# Patient Record
Sex: FEMALE | Race: BLACK OR AFRICAN AMERICAN | Employment: OTHER | URBAN - METROPOLITAN AREA
[De-identification: names, ages, dates, MRNs, and addresses within clinical notes are randomized per-mention and may not be internally consistent; named-entity substitution may affect disease eponyms.]

---

## 2024-01-04 ENCOUNTER — EVALUATION (OUTPATIENT)
Dept: PHYSICAL THERAPY | Age: 84
End: 2024-01-04
Payer: MEDICARE

## 2024-01-04 DIAGNOSIS — M54.2 CERVICAL PAIN: Primary | ICD-10-CM

## 2024-01-04 DIAGNOSIS — M25.511 CHRONIC PAIN OF BOTH SHOULDERS: ICD-10-CM

## 2024-01-04 DIAGNOSIS — M25.512 CHRONIC PAIN OF BOTH SHOULDERS: ICD-10-CM

## 2024-01-04 DIAGNOSIS — G89.29 CHRONIC PAIN OF BOTH SHOULDERS: ICD-10-CM

## 2024-01-04 PROCEDURE — 97140 MANUAL THERAPY 1/> REGIONS: CPT | Performed by: PHYSICAL THERAPIST

## 2024-01-04 PROCEDURE — 97162 PT EVAL MOD COMPLEX 30 MIN: CPT | Performed by: PHYSICAL THERAPIST

## 2024-01-04 NOTE — LETTER
2024    Nacho Kauffman  [address]  [facility] [address]    Patient: Tara Ash   YOB: 1940   Date of Visit: 2024     Encounter Diagnosis     ICD-10-CM    1. Cervical pain  M54.2       2. Chronic pain of both shoulders  M25.511     G89.29     M25.512           Dear Dr. Kauffman:    Thank you for your recent referral of Tara Ash. Please review the attached evaluation summary from Tara's recent visit.     Please verify that you agree with the plan of care by signing the attached order.     If you have any questions or concerns, please do not hesitate to call.     I sincerely appreciate the opportunity to share in the care of one of your patients and hope to have another opportunity to work with you in the near future.       Sincerely,    Keegan Acosta, PT      Referring Provider:      I certify that I have read the below Plan of Care and certify the need for these services furnished under this plan of treatment while under my care.                    Nacho Kauffman  [address] [place] [address]  Via Fax: [phone]          PT Evaluation     Today's date: 2024  Patient name: Tara Ash  : 1940  MRN: [id number]  Referring provider: Keegan Acosta PT  Dx:   Encounter Diagnosis     ICD-10-CM    1. Cervical pain  M54.2       2. Chronic pain of both shoulders  M25.511     G89.29     M25.512                      Assessment  Assessment details: PT IE: 24.  Patient reported she had a cervical spine laminectomy in .  Patient noted she has tightness from the shoulders into the cervical spine.  Patient reported she had open hear surgery, a right RTC repair and left lumpectomy.  Patient noted she had a fell in  which required a left TKA in  and had recently finished home care PT.  Patient noted she did use a spc prior to fall, and now uses RW.  Patient noted prolonged use of rolling  walker aggravates bilateral GH joint, scapular and cervical spine tightness and pain.  Patient noted she has intermittent tingling sensation into upper cervical spine and posterior head.  Patient noted looking to each side is limited by tightness, as well as looking up.  Patient noted constant tightness is very uncomfortable.  Patient noted dressing activities, reaching, lifting, grooming activities are limited by bilateral upper extremity tightness and pain.  Patient noted she has bilateral deltoid muscle soreness.  Patient noted she will have and intermittent left hand trigger finger.  Patient denies unilateral upper extremity paraesthesias.  Patient noted she has bilateral upper extremity weakness and tightness that limits self iadls and functional activities.  Patient noted reaching back to put on a coat is difficult.  Patient noted cervical spine and bilateral shoulder region pain at rest is a 5 of 10 and at worst is at 8 of 10.  Patient noted she uses heat and massage to reduce tightness.  Patient denies sleep deficits.  Patient noted prolonged use of RW aggravates bilateral GH joint, scapular and cervical spine region pain. Patient noted she has crepitus with cervical spine movements, but denies pain with crepitus.   Impairments: abnormal or restricted ROM, abnormal movement, impaired physical strength, lacks appropriate home exercise program and pain with function  Understanding of Dx/Px/POC: excellent   Prognosis: good  Prognosis details: Patient is a 83 y.o. year old female seen for outpatient PT evaluation with pain, mobility and functional deficits due to cervical spine and bilateral shoulder pain. Patient presents to PT IE with the following problems, concerns, deficits and impairments: cervical spine and bilateral upper extremity pain, decreased cervical spine range of motion, decreased bilateral upper extremity mobility and strength, + TTP, decrease in postural awareness, functional limitations and  decreased tolerance to activity.  Patient would benefit from skilled PT services under the following PT treatment plan to address the above noted deficits: therapeutic exercises and activities to facilitate bilateral upper extremity mobility and strength, postural reeducation and strengthening, modalities, manual therapy techniques, IASTM techniques, Kinesio taping techniques and a hep.  Thank you for the referral.     Goals  Short Term goals 4 - 6 weeks  1.  Patient will be independent HEP.   2.  Patient will report a 25 - 50% decrease in pain complaints.  3.  Increase strength 1/2 grade.  4.  Increase ROM 5-10 degrees.    Long Term goals 8 - 12 weeks  1.  Patient will report elimination of pain complaints.  2.  Patient will return to all recreational activities without restriction.  3.  ROM WFL.  4.  Strength 5/5.  5.  Patient will report ability to dress without cervical spine or bilateral upper extremity symptom aggravation or limitations.  6.  Patient will report ability to self reaching activities without cervical spine or bilateral upper extremity symptom aggravation or limitations.  7.  Patient will report ability to perform self lifting based activities without cervical spine or bilateral upper extremity symptom aggravation or limitations.  8.  Patient will report ability to perform grooming activities without cervical spine or bilateral upper extremity symptom aggravation or limitations.    Plan  Patient would benefit from: PT eval and skilled physical therapy  Planned modality interventions: low level laser therapy, TENS, thermotherapy: hydrocollator packs, ultrasound, unattended electrical stimulation, traction, cryotherapy and electrical stimulation/Russian stimulation  Planned therapy interventions: IASTM, joint mobilization, kinesiology taping, manual therapy, massage, neuromuscular re-education, balance, balance/weight bearing training, body mechanics training, patient education, postural training,  compression, strengthening, self care, stretching, therapeutic activities, therapeutic exercise, therapeutic training, transfer training, flexibility, functional ROM exercises, gait training, graded activity, graded exercise, graded motor, home exercise program and IADL retraining  Frequency: 2x week  Duration in weeks: 12  Treatment plan discussed with: patient    Subjective Evaluation    History of Present Illness  Mechanism of injury:   Past Medical History:  Diagnosis Date  Ambulatory dysfunction 04/16/2017  Anxiety  Arthritis  Asthma  CAD (coronary artery disease)  Cataracts, bilateral  Colon polyps  Corneal dystrophy  Coronary atherosclerosis 08/23/2012  Duodenal ulcer  Esophageal reflux 04/11/2014  Essential hypertension 08/23/2012  Gastroparesis  GERD (gastroesophageal reflux disease)  Heart disease  Heart disease  Hiatal hernia  Hyperlipidemia  Hypertension  IBS (irritable bowel syndrome)  Low back pain  Migraine  PAD (peripheral artery disease) ([facility])  PAF (paroxysmal atrial fibrillation) ([facility]) 01/09/2020  Quadriceps tendon rupture 06/01/2023  Schatzki's ring 2014  Sinusitis  Spinal stenosis of lumbar region 03/26/2012  Subclavian artery stenosis, left ([facility])    Past Surgical History:  Procedure Laterality Date  ANGIOPLASTY Right 09/20/2017  (R) CFA angioplasty only  BACK SURGERY 2013  back fusion  BREAST LUMPECTOMY Left 2004  CARPAL TUNNEL RELEASE Right  CATARACT EXTRACTION Bilateral  CERVICAL LAMINECTOMY 1997  [facility]  COLONOSCOPY N/A 10/25/2018  Performed by Saman Gonzales MD at [facility] OR  CORONARY ARTERY BYPASS GRAFT 2005  EYE SURGERY  FEMORAL ARTERY - POPLITEAL ARTERY BYPASS GRAFT Right 04/11/2017  FEMORAL ENDARTERECTOMY Right 04/2017  HYSTERECTOMY  JOINT REPLACEMENT  KNEE SURGERY Right  MS COLONOSCOPY FLX DX W/COLLJ SPEC WHEN PFRMD N/A 09/14/2015  Procedure: COLONOSCOPY; Surgeon: Maicol Garcia MD; Location: [facility]; Service: Gastroenterology  QUADRICEPS TENDON  REPAIR Left 06/06/2023  knee  ROTATOR CUFF REPAIR Right  SINUS SURGERY  SINUS SURGERY  TOTAL ABDOMINAL HYSTERECTOMY W/ BILATERAL SALPINGOOPHORECTOMY  WISDOM TOOTH EXTRACTION    PE:  Gen: Pt sitting upright in chair in NAD  Left Lower Extremity: Incisions well healed. No erythema, drainage or evidence of wound dehiscence. no TTP overlying anterior knee. Neurovasc intact, distal pulses intact, sensory intact, pt able to move all toes ankle and knee.    RLE:  Well healed surgical incision, Mod soft tissue swelling, TTP anterior and medial joint line, crepitus, slightly limited ROM, small effusion, no lig instability    XR:None taken today    Dx:    6 Months S/p Left quadriceps tendon repair, OA    Acute on chronic right knee pain, OA - worsened  CT spine cervical wo contrast  Order: [id number]  Impression    IMPRESSION:    No acute intracranial trauma.    Postoperative changes of the cervical spine without evidence of acute fracture.    Please follow cervical spine trauma protocol          Workstation:[id number]  Narrative    History: Trauma    CT examination of the brain and cervical spine was performed using standard  departmental technique.    There are no prior studies available for comparison.      Findings:    The ventricles, sulci and cisterns are prominent consistent with age-appropriate  volume loss. There is intracranial vascular calcification. There is low  attenuation in the periventricular white matter, nonspecific but most suggestive  of small vessel ischemic disease. There is no evidence of intracranial  hemorrhage, mass, mass-effect or acute focal territorial infarction. There is no  depressed skull fracture.    Imaging of the cervical spine demonstrates straightening and slight reversal the  normal lordotic curve. There is loss disc space height at C4-5 and C5-6 C6-7  less so at C3-4. There is been prior laminectomies and resection spinous process  at C3, C4, C5, C6 and C7.    At C2-3 there are degenerative  change facets.    At C3-4 degenerative changes of the right greater than left facets and right  uncovertebral spurring causing right neural foraminal stenosis.    At C4-5 there is bilateral uncovertebral spurring with a posterior disc  osteophyte complex. There is bilateral neural foraminal stenosis.    At C5-6 is bilateral uncovertebral spurring with right greater than left neural  foraminal stenosis. Is a prominent posterior disc osteophyte complex.    At C6-7 there is right greater than left uncovertebral spurring.          Patient Goals  Patient goals for therapy: decreased pain, increased motion, increased strength, independence with ADLs/IADLs and return to sport/leisure activities    Pain  At best pain ratin  At worst pain ratin  Location: cervical spine and bilateral shoulders / scapular region        Objective     Tenderness     Additional Tenderness Details  Patient is     Active Range of Motion   Cervical/Thoracic Spine       Cervical    Flexion: 30 degrees   Extension: 24 degrees      Left lateral flexion: 14 degrees      Right lateral flexion: 16 degrees      Left rotation: 40 degrees with pain  Right rotation: 36 degrees    with pain  Left Shoulder   Flexion: 142 degrees with pain  Abduction: 138 degrees with pain    Right Shoulder   Flexion: 160 degrees   Abduction: 152 degrees with pain    Strength/Myotome Testing     Left Shoulder     Planes of Motion   Flexion: 4-   Abduction: 3+   External rotation at 0°: 4-   Internal rotation at 0°: 4     Right Shoulder     Planes of Motion   Flexion: 4-   Abduction: 3+   External rotation at 0°: 4-   Internal rotation at 0°: 4     Left Elbow   Flexion: 4+  Extension: 4    Right Elbow   Flexion: 4+  Extension: 4             Precautions: Fall Risk with recent fall history.      Access Code: [email or web address]  URL: [email or web address]  Date: 2024  Prepared by: Keegan Acosta    Exercises  - Correct Seated Posture  - 1 x daily - 7 x weekly - 2  sets - 10 reps  - Seated Gentle Upper Trapezius Stretch  - 1 x daily - 7 x weekly - 1 sets - 5 reps - 10 seconds hold  - Gentle Levator Scapulae Stretch  - 1 x daily - 7 x weekly - 1 sets - 5 reps - 10 seconds hold  - Seated Scapular Retraction  - 1 x daily - 7 x weekly - 2 sets - 10 reps      Manuals 1/4/24            STM to bilateral UT, cervical and thoracic spine paraspinal musculature 10 min seated  Trial supine                                                  Neuro Re-Ed                                                                                                        Ther Ex                          pulleys             Corner pec stretch with bilateral upper extremity at 45 degrees             Scapular squeezes:B: hep                         Seated physio ball lumbar / shoulder flexion:B:             UT stretch:B: without over pressure hep            LS stretch:B: without over pressure hep            Seated postural correction slouch over correct hep                         Cervical spine rotation with towel mobilization to each side                          Shoulder scaption and flexoin:B:             Biceps curls:B:             Tband rows and extension:B:                          Supine shoulder flexion and bench press:B:             Scapular punches and triceps extension:B:             Side lying ER and abduction:B:                                                                              Ther Activity                                       Gait Training                                       Modalities             MHP to cervical spine seated 10 min

## 2024-01-04 NOTE — PROGRESS NOTES
PT Evaluation     Today's date: 2024  Patient name: Tara Ash  : 1940  MRN: [id number]  Referring provider: Keegan Acosta, BAUDILIO  Dx:   Encounter Diagnosis     ICD-10-CM    1. Cervical pain  M54.2       2. Chronic pain of both shoulders  M25.511     G89.29     M25.512                      Assessment  Assessment details: PT IE: 24.  Patient reported she had a cervical spine laminectomy in .  Patient noted she has tightness from the shoulders into the cervical spine.  Patient reported she had open hear surgery, a right RTC repair and left lumpectomy.  Patient noted she had a fell in  which required a left TKA in  and had recently finished home care PT.  Patient noted she did use a spc prior to fall, and now uses RW.  Patient noted prolonged use of rolling walker aggravates bilateral GH joint, scapular and cervical spine tightness and pain.  Patient noted she has intermittent tingling sensation into upper cervical spine and posterior head.  Patient noted looking to each side is limited by tightness, as well as looking up.  Patient noted constant tightness is very uncomfortable.  Patient noted dressing activities, reaching, lifting, grooming activities are limited by bilateral upper extremity tightness and pain.  Patient noted she has bilateral deltoid muscle soreness.  Patient noted she will have and intermittent left hand trigger finger.  Patient denies unilateral upper extremity paraesthesias.  Patient noted she has bilateral upper extremity weakness and tightness that limits self iadls and functional activities.  Patient noted reaching back to put on a coat is difficult.  Patient noted cervical spine and bilateral shoulder region pain at rest is a 5 of 10 and at worst is at 8 of 10.  Patient noted she uses heat and massage to reduce tightness.  Patient denies sleep deficits.  Patient noted prolonged use of RW aggravates bilateral GH joint, scapular and cervical spine region pain.  Patient noted she has crepitus with cervical spine movements, but denies pain with crepitus.   Impairments: abnormal or restricted ROM, abnormal movement, impaired physical strength, lacks appropriate home exercise program and pain with function  Understanding of Dx/Px/POC: excellent   Prognosis: good  Prognosis details: Patient is a 83 y.o. year old female seen for outpatient PT evaluation with pain, mobility and functional deficits due to cervical spine and bilateral shoulder pain. Patient presents to PT IE with the following problems, concerns, deficits and impairments: cervical spine and bilateral upper extremity pain, decreased cervical spine range of motion, decreased bilateral upper extremity mobility and strength, + TTP, decrease in postural awareness, functional limitations and decreased tolerance to activity.  Patient would benefit from skilled PT services under the following PT treatment plan to address the above noted deficits: therapeutic exercises and activities to facilitate bilateral upper extremity mobility and strength, postural reeducation and strengthening, modalities, manual therapy techniques, IASTM techniques, Kinesio taping techniques and a hep.  Thank you for the referral.     Goals  Short Term goals 4 - 6 weeks  1.  Patient will be independent HEP.   2.  Patient will report a 25 - 50% decrease in pain complaints.  3.  Increase strength 1/2 grade.  4.  Increase ROM 5-10 degrees.    Long Term goals 8 - 12 weeks  1.  Patient will report elimination of pain complaints.  2.  Patient will return to all recreational activities without restriction.  3.  ROM WFL.  4.  Strength 5/5.  5.  Patient will report ability to dress without cervical spine or bilateral upper extremity symptom aggravation or limitations.  6.  Patient will report ability to self reaching activities without cervical spine or bilateral upper extremity symptom aggravation or limitations.  7.  Patient will report ability to perform  self lifting based activities without cervical spine or bilateral upper extremity symptom aggravation or limitations.  8.  Patient will report ability to perform grooming activities without cervical spine or bilateral upper extremity symptom aggravation or limitations.    Plan  Patient would benefit from: PT eval and skilled physical therapy  Planned modality interventions: low level laser therapy, TENS, thermotherapy: hydrocollator packs, ultrasound, unattended electrical stimulation, traction, cryotherapy and electrical stimulation/Russian stimulation  Planned therapy interventions: IASTM, joint mobilization, kinesiology taping, manual therapy, massage, neuromuscular re-education, balance, balance/weight bearing training, body mechanics training, patient education, postural training, compression, strengthening, self care, stretching, therapeutic activities, therapeutic exercise, therapeutic training, transfer training, flexibility, functional ROM exercises, gait training, graded activity, graded exercise, graded motor, home exercise program and IADL retraining  Frequency: 2x week  Duration in weeks: 12  Treatment plan discussed with: patient    Subjective Evaluation    History of Present Illness  Mechanism of injury:   Past Medical History:  Diagnosis Date  Ambulatory dysfunction 04/16/2017  Anxiety  Arthritis  Asthma  CAD (coronary artery disease)  Cataracts, bilateral  Colon polyps  Corneal dystrophy  Coronary atherosclerosis 08/23/2012  Duodenal ulcer  Esophageal reflux 04/11/2014  Essential hypertension 08/23/2012  Gastroparesis  GERD (gastroesophageal reflux disease)  Heart disease  Heart disease  Hiatal hernia  Hyperlipidemia  Hypertension  IBS (irritable bowel syndrome)  Low back pain  Migraine  PAD (peripheral artery disease) ([facility])  PAF (paroxysmal atrial fibrillation) ([facility]) 01/09/2020  Quadriceps tendon rupture 06/01/2023  Schatzki's ring 2014  Sinusitis  Spinal stenosis of lumbar region  03/26/2012  Subclavian artery stenosis, left (H[facility])    Past Surgical History:  Procedure Laterality Date  ANGIOPLASTY Right 09/20/2017  (R) CFA angioplasty only  BACK SURGERY 2013  back fusion  BREAST LUMPECTOMY Left 2004  CARPAL TUNNEL RELEASE Right  CATARACT EXTRACTION Bilateral  [facility]  COLONOSCOPY N/A 10/25/2018  Performed by Saman Gonzales MD at [facility] OR  CORONARY ARTERY BYPASS GRAFT 2005  EYE SURGERY  FEMORAL ARTERY - POPLITEAL ARTERY BYPASS GRAFT Right 04/11/2017  FEMORAL ENDARTERECTOMY Right 04/2017  HYSTERECTOMY  JOINT REPLACEMENT  KNEE SURGERY Right  AK COLONOSCOPY FLX DX W/COLLJ SPEC WHEN PFRMD N/A 09/14/2015  Procedure: COLONOSCOPY; Surgeon: Maicol Garcia MD; Location: [facility] OR; Service: Gastroenterology  QUADRICEPS TENDON REPAIR Left 06/06/2023  knee  ROTATOR CUFF REPAIR Right  SINUS SURGERY  SINUS SURGERY  TOTAL ABDOMINAL HYSTERECTOMY W/ BILATERAL SALPINGOOPHORECTOMY  WISDOM TOOTH EXTRACTION    PE:  Gen: Pt sitting upright in chair in NAD  Left Lower Extremity: Incisions well healed. No erythema, drainage or evidence of wound dehiscence. no TTP overlying anterior knee. Neurovasc intact, distal pulses intact, sensory intact, pt able to move all toes ankle and knee.    RLE:  Well healed surgical incision, Mod soft tissue swelling, TTP anterior and medial joint line, crepitus, slightly limited ROM, small effusion, no lig instability    XR:None taken today    Dx:    6 Months S/p Left quadriceps tendon repair, OA    Acute on chronic right knee pain, OA - worsened  CT spine cervical wo contrast  Order: [id number]  Impression    IMPRESSION:    No acute intracranial trauma.    Postoperative changes of the cervical spine without evidence of acute fracture.    Please follow cervical spine trauma protocol          Workstation:[id number]  Narrative    History: Trauma    CT examination of the brain and cervical spine was performed using  standard  departmental technique.    There are no prior studies available for comparison.      Findings:    The ventricles, sulci and cisterns are prominent consistent with age-appropriate  volume loss. There is intracranial vascular calcification. There is low  attenuation in the periventricular white matter, nonspecific but most suggestive  of small vessel ischemic disease. There is no evidence of intracranial  hemorrhage, mass, mass-effect or acute focal territorial infarction. There is no  depressed skull fracture.    Imaging of the cervical spine demonstrates straightening and slight reversal the  normal lordotic curve. There is loss disc space height at C4-5 and C5-6 C6-7  less so at C3-4. There is been prior laminectomies and resection spinous process  at C3, C4, C5, C6 and C7.    At C2-3 there are degenerative change facets.    At C3-4 degenerative changes of the right greater than left facets and right  uncovertebral spurring causing right neural foraminal stenosis.    At C4-5 there is bilateral uncovertebral spurring with a posterior disc  osteophyte complex. There is bilateral neural foraminal stenosis.    At C5-6 is bilateral uncovertebral spurring with right greater than left neural  foraminal stenosis. Is a prominent posterior disc osteophyte complex.    At C6-7 there is right greater than left uncovertebral spurring.          Patient Goals  Patient goals for therapy: decreased pain, increased motion, increased strength, independence with ADLs/IADLs and return to sport/leisure activities    Pain  At best pain ratin  At worst pain ratin  Location: cervical spine and bilateral shoulders / scapular region        Objective     Tenderness     Additional Tenderness Details  Patient is     Active Range of Motion   Cervical/Thoracic Spine       Cervical    Flexion: 30 degrees   Extension: 24 degrees      Left lateral flexion: 14 degrees      Right lateral flexion: 16 degrees      Left rotation: 40  degrees with pain  Right rotation: 36 degrees    with pain  Left Shoulder   Flexion: 142 degrees with pain  Abduction: 138 degrees with pain    Right Shoulder   Flexion: 160 degrees   Abduction: 152 degrees with pain    Strength/Myotome Testing     Left Shoulder     Planes of Motion   Flexion: 4-   Abduction: 3+   External rotation at 0°: 4-   Internal rotation at 0°: 4     Right Shoulder     Planes of Motion   Flexion: 4-   Abduction: 3+   External rotation at 0°: 4-   Internal rotation at 0°: 4     Left Elbow   Flexion: 4+  Extension: 4    Right Elbow   Flexion: 4+  Extension: 4             Precautions: Fall Risk with recent fall history.      Access Code: [email or web address]  URL: [email or web address]  Date: 01/04/2024  Prepared by: Keegan Acosta    Exercises  - Correct Seated Posture  - 1 x daily - 7 x weekly - 2 sets - 10 reps  - Seated Gentle Upper Trapezius Stretch  - 1 x daily - 7 x weekly - 1 sets - 5 reps - 10 seconds hold  - Gentle Levator Scapulae Stretch  - 1 x daily - 7 x weekly - 1 sets - 5 reps - 10 seconds hold  - Seated Scapular Retraction  - 1 x daily - 7 x weekly - 2 sets - 10 reps      Manuals 1/4/24            STM to bilateral UT, cervical and thoracic spine paraspinal musculature 10 min seated  Trial supine                                                  Neuro Re-Ed                                                                                                        Ther Ex                          pulleys             Corner pec stretch with bilateral upper extremity at 45 degrees             Scapular squeezes:B: hep                         Seated physio ball lumbar / shoulder flexion:B:             UT stretch:B: without over pressure hep            LS stretch:B: without over pressure hep            Seated postural correction slouch over correct hep                         Cervical spine rotation with towel mobilization to each side                          Shoulder scaption and  flexoin:B:             Biceps curls:B:             Tband rows and extension:B:                          Supine shoulder flexion and bench press:B:             Scapular punches and triceps extension:B:             Side lying ER and abduction:B:                                                                              Ther Activity                                       Gait Training                                       Modalities             MHP to cervical spine seated 10 min

## 2024-01-08 ENCOUNTER — OFFICE VISIT (OUTPATIENT)
Dept: PHYSICAL THERAPY | Age: 84
End: 2024-01-08
Payer: MEDICARE

## 2024-01-08 DIAGNOSIS — M25.511 CHRONIC PAIN OF BOTH SHOULDERS: Primary | ICD-10-CM

## 2024-01-08 DIAGNOSIS — M54.2 CERVICAL PAIN: ICD-10-CM

## 2024-01-08 DIAGNOSIS — G89.29 CHRONIC PAIN OF BOTH SHOULDERS: Primary | ICD-10-CM

## 2024-01-08 DIAGNOSIS — M25.512 CHRONIC PAIN OF BOTH SHOULDERS: Primary | ICD-10-CM

## 2024-01-08 PROCEDURE — 97110 THERAPEUTIC EXERCISES: CPT

## 2024-01-08 PROCEDURE — 97140 MANUAL THERAPY 1/> REGIONS: CPT

## 2024-01-08 NOTE — PROGRESS NOTES
"Daily Note     Today's date: 2024  Patient name: Tara Ash  : 1940  MRN: [id number]  Referring provider: Keegan Acosta PT  Dx:   Encounter Diagnosis     ICD-10-CM    1. Chronic pain of both shoulders  M25.511     G89.29     M25.512       2. Cervical pain  M54.2                      Subjective: Pt reported 4/10 B shoulder pain \"both of my shoulders were hurting yesterday\"       Objective: See treatment diary below      Assessment: Decreased B UT spasms and fascia restriction after manual work. Increased B cervical ROM as well. Progress as able       Plan: Cont with Plan of care      Precautions: Fall Risk with recent fall history.      Access Code: [email or web address]  URL: [email or web address]  Date: 2024  Prepared by: Keegan Acosta    Exercises  - Correct Seated Posture  - 1 x daily - 7 x weekly - 2 sets - 10 reps  - Seated Gentle Upper Trapezius Stretch  - 1 x daily - 7 x weekly - 1 sets - 5 reps - 10 seconds hold  - Gentle Levator Scapulae Stretch  - 1 x daily - 7 x weekly - 1 sets - 5 reps - 10 seconds hold  - Seated Scapular Retraction  - 1 x daily - 7 x weekly - 2 sets - 10 reps      Manuals 24           STM to bilateral UT, cervical and thoracic spine paraspinal musculature 10 min seated  Seated     15 min                         Neuro Re-Ed                          Ther Ex                          pulleys  5 min            Corner pec stretch with bilateral upper extremity at 45 degrees             Scapular squeezes:B: hep 20X 3SEC                         Seated physio ball lumbar / shoulder flexion:B:  20X 2SEC            UT stretch:B: without over pressure hep NT           LS stretch:B: without over pressure hep NT           Seated postural correction slouch over correct hep 20X                         Cervical spine rotation with towel mobilization to each side  NT                        Shoulder scaption and flexoin:B:  20X            Biceps curls:B:  20X          "   Tband rows and extension:B:  RTB   20X                         Supine shoulder flexion and bench press:B:  20X            Scapular punches and triceps extension:B:  NT           Side lying ER and abduction:B:  NT                                                                            Ther Activity                                         Gait Training                                       Modalities             MHP to cervical spine seated 10 min 10 min

## 2024-01-10 ENCOUNTER — OFFICE VISIT (OUTPATIENT)
Dept: PHYSICAL THERAPY | Age: 84
End: 2024-01-10
Payer: MEDICARE

## 2024-01-10 DIAGNOSIS — M54.2 CERVICAL PAIN: ICD-10-CM

## 2024-01-10 DIAGNOSIS — G89.29 CHRONIC PAIN OF BOTH SHOULDERS: Primary | ICD-10-CM

## 2024-01-10 DIAGNOSIS — M25.512 CHRONIC PAIN OF BOTH SHOULDERS: Primary | ICD-10-CM

## 2024-01-10 DIAGNOSIS — M25.511 CHRONIC PAIN OF BOTH SHOULDERS: Primary | ICD-10-CM

## 2024-01-10 PROCEDURE — 97110 THERAPEUTIC EXERCISES: CPT | Performed by: PHYSICAL THERAPIST

## 2024-01-10 PROCEDURE — 97140 MANUAL THERAPY 1/> REGIONS: CPT | Performed by: PHYSICAL THERAPIST

## 2024-01-10 NOTE — PROGRESS NOTES
Daily Note     Today's date: 1/10/2024  Patient name: Tara Ash  : 1940  MRN: [id number]  Referring provider: Keegan Acosta, PT  Dx:   Encounter Diagnosis     ICD-10-CM    1. Chronic pain of both shoulders  M25.511     G89.29     M25.512       2. Cervical pain  M54.2                      Subjective: Patient reported she has cervical to upper thoracic spine that is aggravated with prolonged sitting. Patient reported her bilateral shoulder, UT, cervical and upper thoracic spine pain is at a 6 of 10 which is aggravated more today than at onset of previous PT treatment session.  Patient noted she has cervical spine crepitus without pain.    Objective: See treatment diary below      Assessment: Patient presents with lack of full cervical spine retraction and extension due to excessive bias of cervical spine protraction that causes prolonged postural decline and pain aggravation.  But, she does exhibit short term pain reduction with use of modalities, manual therapy and postural correction.  But, she lacks long term postural changes that aggravate cervical, thoracic, bilateral UT to shoulder pain.  Also, she exhibits severe bilateral UT, cervical and thoracic spine paraspinal musculature exhibiting severe muscle guarding that limits postural changes. Thus, PT is warranted to facilitate cervical spine mobility, bilateral upper extremity and postural strengthening to promote long term symptom reduction and functional progress.       Plan: Cont with Plan of care      Precautions: Fall Risk with recent fall history.      Access Code: [email or web address]  URL: [email or web address]  Date: 2024  Prepared by: Keegan Acosta    Exercises  - Correct Seated Posture  - 1 x daily - 7 x weekly - 2 sets - 10 reps  - Seated Gentle Upper Trapezius Stretch  - 1 x daily - 7 x weekly - 1 sets - 5 reps - 10 seconds hold  - Gentle Levator Scapulae Stretch  - 1 x daily - 7 x weekly - 1 sets - 5 reps - 10 seconds  hold  - Seated Scapular Retraction  - 1 x daily - 7 x weekly - 2 sets - 10 reps      Manuals 1/4/24 1/8 1/10          STM to bilateral UT, cervical and thoracic spine paraspinal musculature 10 min seated  Seated     15 min  Seated x 15 min                       Neuro Re-Ed                          Ther Ex                          pulleys  5 min  5 min          Corner pec stretch with bilateral upper extremity at 45 degrees  NT NT          Scapular squeezes:B: hep 20X 3SEC  3 sec x 20                       Seated physio ball lumbar / shoulder flexion:B:  20X 2SEC  2 x 10          UT stretch:B: without over pressure hep NT 10 sec x 5          LS stretch:B: without over pressure hep NT 10 sec x 5          Seated postural correction slouch over correct hep 20X  2 sec x 20                       Cervical spine rotation with towel mobilization to each side  NT NT          Cervical retraction   10 x           Shoulder scaption and flexion:B:  20X  2 x 10          Biceps curls:B:  20X  2 x 10          Tband rows and extension:B:  RTB   20X  RTB x 20                       Supine shoulder flexion and bench press:B:  20X  NT          Scapular punches and triceps extension:B:  NT NT          Side lying ER and abduction:B:  NT NT                                                                           Ther Activity                                         Gait Training                                       Modalities             MHP to cervical spine seated 10 min 10 min  10 min

## 2024-01-15 ENCOUNTER — OFFICE VISIT (OUTPATIENT)
Dept: PHYSICAL THERAPY | Age: 84
End: 2024-01-15
Payer: MEDICARE

## 2024-01-15 DIAGNOSIS — G89.29 CHRONIC PAIN OF BOTH SHOULDERS: Primary | ICD-10-CM

## 2024-01-15 DIAGNOSIS — M54.2 CERVICAL PAIN: ICD-10-CM

## 2024-01-15 DIAGNOSIS — M25.512 CHRONIC PAIN OF BOTH SHOULDERS: Primary | ICD-10-CM

## 2024-01-15 DIAGNOSIS — M25.511 CHRONIC PAIN OF BOTH SHOULDERS: Primary | ICD-10-CM

## 2024-01-15 PROCEDURE — 97110 THERAPEUTIC EXERCISES: CPT

## 2024-01-15 PROCEDURE — 97140 MANUAL THERAPY 1/> REGIONS: CPT

## 2024-01-15 NOTE — PROGRESS NOTES
"Daily Note     Today's date: 1/15/2024  Patient name: Tara Ash  : 1940  MRN: [id number]  Referring provider: Keegan Acosta PT  Dx:   Encounter Diagnosis     ICD-10-CM    1. Chronic pain of both shoulders  M25.511     G89.29     M25.512       2. Cervical pain  M54.2                        Subjective: Patient reported \"I am sore and tired today\"     Objective: See treatment diary below      Assessment: Decreased R scap, thoracic, B UT and cervical pain today after manual work and exercises. Progress as able       Plan: Cont with Plan of care      Precautions: Fall Risk with recent fall history.      Access Code: [email or web address]  URL: [email or web address]  Date: 2024  Prepared by: Keegan Acosta    Exercises  - Correct Seated Posture  - 1 x daily - 7 x weekly - 2 sets - 10 reps  - Seated Gentle Upper Trapezius Stretch  - 1 x daily - 7 x weekly - 1 sets - 5 reps - 10 seconds hold  - Gentle Levator Scapulae Stretch  - 1 x daily - 7 x weekly - 1 sets - 5 reps - 10 seconds hold  - Seated Scapular Retraction  - 1 x daily - 7 x weekly - 2 sets - 10 reps      Manuals 1/4/24 1/8 1/10 1/15         STM to bilateral UT, cervical and thoracic spine paraspinal musculature 10 min seated  Seated     15 min  Seated x 15 min 10 min                       Neuro Re-Ed                          Ther Ex                          pulleys  5 min  5 min 6 min          Corner pec stretch with bilateral upper extremity at 45 degrees  NT NT NT         Scapular squeezes:B: hep 20X 3SEC  3 sec x 20 20X 3SEC                       Seated physio ball lumbar / shoulder flexion:B:  20X 2SEC  2 x 10 30X          UT stretch:B: without over pressure hep NT 10 sec x 5 20SEC 5X          LS stretch:B: without over pressure hep NT 10 sec x 5 NT         Seated postural correction slouch over correct hep 20X  2 sec x 20 20x                       Cervical spine rotation with towel mobilization to each side  NT NT NT         Cervical " retraction   10 x  20X 3SEC          Shoulder scaption and flexion:B:  20X  2 x 10 20X          Biceps curls:B:  20X  2 x 10 20X 2#          Tband rows and extension:B:  RTB   20X  RTB x 20 RTB 20X                       Supine shoulder flexion and bench press:B:  20X  NT NT         Scapular punches and triceps extension:B:  NT NT NT         Side lying ER and abduction:B:  NT NT NT                                                                           Ther Activity                                         Gait Training                                       Modalities             MHP to cervical spine seated 10 min 10 min  10 min 10 MIN

## 2024-01-17 ENCOUNTER — OFFICE VISIT (OUTPATIENT)
Dept: PHYSICAL THERAPY | Age: 84
End: 2024-01-17
Payer: MEDICARE

## 2024-01-17 DIAGNOSIS — M25.512 CHRONIC PAIN OF BOTH SHOULDERS: ICD-10-CM

## 2024-01-17 DIAGNOSIS — M25.511 CHRONIC PAIN OF BOTH SHOULDERS: ICD-10-CM

## 2024-01-17 DIAGNOSIS — G89.29 CHRONIC PAIN OF BOTH SHOULDERS: ICD-10-CM

## 2024-01-17 DIAGNOSIS — M54.2 CERVICAL PAIN: Primary | ICD-10-CM

## 2024-01-17 PROCEDURE — 97140 MANUAL THERAPY 1/> REGIONS: CPT

## 2024-01-17 PROCEDURE — 97110 THERAPEUTIC EXERCISES: CPT

## 2024-01-17 NOTE — PROGRESS NOTES
"Daily Note     Today's date: 2024  Patient name: Tara Ash  : 1940  MRN: [id number]  Referring provider: Keegan Acosta PT  Dx:   Encounter Diagnosis     ICD-10-CM    1. Cervical pain  M54.2       2. Chronic pain of both shoulders  M25.511     G89.29     M25.512                          Subjective: Patient reported \"I have been feeling better\"     Objective: See treatment diary below      Assessment: Pt presents with improved tolerance to exercises. Progress as able       Plan: Cont with Plan of care      Precautions: Fall Risk with recent fall history.      Access Code: [email or web address]  URL: [email or web address]  Date: 2024  Prepared by: Keegan Acosta    Exercises  - Correct Seated Posture  - 1 x daily - 7 x weekly - 2 sets - 10 reps  - Seated Gentle Upper Trapezius Stretch  - 1 x daily - 7 x weekly - 1 sets - 5 reps - 10 seconds hold  - Gentle Levator Scapulae Stretch  - 1 x daily - 7 x weekly - 1 sets - 5 reps - 10 seconds hold  - Seated Scapular Retraction  - 1 x daily - 7 x weekly - 2 sets - 10 reps      Manuals 1/4/24 1/8 1/10 1/15 1/17        STM to bilateral UT, cervical and thoracic spine paraspinal musculature 10 min seated  Seated     15 min  Seated x 15 min 10 min  10 min                      Neuro Re-Ed                          Ther Ex                          pulleys  5 min  5 min 6 min  6 min         Corner pec stretch with bilateral upper extremity at 45 degrees  NT NT NT NT        Scapular squeezes:B: hep 20X 3SEC  3 sec x 20 20X 3SEC  20X 3SEC                      Seated physio ball lumbar / shoulder flexion:B:  20X 2SEC  2 x 10 30X  30X 2SEC         UT stretch:B: without over pressure hep NT 10 sec x 5 20SEC 5X  20SEC 5X         LS stretch:B: without over pressure hep NT 10 sec x 5 NT NT        Seated postural correction slouch over correct hep 20X  2 sec x 20 20x  20X                      Cervical spine rotation with towel mobilization to each side  NT NT NT NT "        Cervical retraction   10 x  20X 3SEC  20X 3SEC         Shoulder scaption and flexion:B:  20X  2 x 10 20X  20X 1#         Biceps curls:B:  20X  2 x 10 20X 2#  20X 2#         Tband rows and extension:B:  RTB   20X  RTB x 20 RTB 20X  BTB 20X                      Supine shoulder flexion and bench press:B:  20X  NT NT NT        Scapular punches and triceps extension:B:  NT NT NT NT        Side lying ER and abduction:B:  NT NT NT NT                                                                          Ther Activity                                         Gait Training                                       Modalities             MHP to cervical spine seated 10 min 10 min  10 min 10 MIN  10 MIN

## 2024-01-22 ENCOUNTER — APPOINTMENT (OUTPATIENT)
Dept: PHYSICAL THERAPY | Age: 84
End: 2024-01-22
Payer: MEDICARE

## 2024-01-29 ENCOUNTER — OFFICE VISIT (OUTPATIENT)
Dept: PHYSICAL THERAPY | Age: 84
End: 2024-01-29
Payer: MEDICARE

## 2024-01-29 DIAGNOSIS — M54.2 CERVICAL PAIN: Primary | ICD-10-CM

## 2024-01-29 DIAGNOSIS — G89.29 CHRONIC PAIN OF BOTH SHOULDERS: ICD-10-CM

## 2024-01-29 DIAGNOSIS — M25.511 CHRONIC PAIN OF BOTH SHOULDERS: ICD-10-CM

## 2024-01-29 DIAGNOSIS — M25.512 CHRONIC PAIN OF BOTH SHOULDERS: ICD-10-CM

## 2024-01-29 PROCEDURE — 97140 MANUAL THERAPY 1/> REGIONS: CPT

## 2024-01-29 PROCEDURE — 97110 THERAPEUTIC EXERCISES: CPT

## 2024-01-29 NOTE — PROGRESS NOTES
"Daily Note     Today's date: 2024  Patient name: Tara Ash  : 1940  MRN: [id number]  Referring provider: Keegan Acosta PT  Dx:   Encounter Diagnosis     ICD-10-CM    1. Cervical pain  M54.2       2. Chronic pain of both shoulders  M25.511     G89.29     M25.512                            Subjective: Patient reported \"my headache is better today. I have been having headaches that's why I havent been here. My shoulders are so tight\"     Objective: See treatment diary below      Assessment: Pt educated in the importance of upright posture and continued HEP to manage B UT and cervical spasms. Decreased overall symptoms after manual work and modalities.         Plan: Cont with Plan of care      Precautions: Fall Risk with recent fall history.      Access Code: [email or web address]  URL: [email or web address]  Date: 2024  Prepared by: Keegan Acosta    Exercises  - Correct Seated Posture  - 1 x daily - 7 x weekly - 2 sets - 10 reps  - Seated Gentle Upper Trapezius Stretch  - 1 x daily - 7 x weekly - 1 sets - 5 reps - 10 seconds hold  - Gentle Levator Scapulae Stretch  - 1 x daily - 7 x weekly - 1 sets - 5 reps - 10 seconds hold  - Seated Scapular Retraction  - 1 x daily - 7 x weekly - 2 sets - 10 reps      Manuals 1/4/24 1/8 1/10 1/15 1/17 1/29       STM to bilateral UT, cervical and thoracic spine paraspinal musculature 10 min seated  Seated     15 min  Seated x 15 min 10 min  10 min  10 min                    Neuro Re-Ed                          Ther Ex                          pulleys  5 min  5 min 6 min  6 min  6 min        Corner pec stretch with bilateral upper extremity at 45 degrees  NT NT NT NT NT       Scapular squeezes:B: hep 20X 3SEC  3 sec x 20 20X 3SEC  20X 3SEC  20x 3sec                     Seated physio ball lumbar / shoulder flexion:B:  20X 2SEC  2 x 10 30X  30X 2SEC  30X 2SEC        UT stretch:B: without over pressure hep NT 10 sec x 5 20SEC 5X  20SEC 5X  [id number]        LS " stretch:B: without over pressure hep NT 10 sec x 5 NT NT NT       Seated postural correction slouch over correct hep 20X  2 sec x 20 20x  20X  20X        Cervical retraction with ext       20x        Cervical spine rotation with towel mobilization to each side  NT NT NT NT NT        Cervical retraction   10 x  20X 3SEC  20X 3SEC  20X 3sec         Shoulder scaption and flexion:B:  20X  2 x 10 20X  20X 1#  20x 1#        Biceps curls:B:  20X  2 x 10 20X 2#  20X 2#  20x 3#        Tband rows    RTB   20X  RTB x 20 RTB 20X  BTB 20X  RTB  20X                      Supine shoulder flexion and bench press:B:  20X  NT NT NT NT       Scapular punches and triceps extension:B:  NT NT NT NT NT       Side lying ER and abduction:B:  NT NT NT NT NT                                                                         Ther Activity                                         Gait Training                                       Modalities             MHP to cervical spine seated 10 min 10 min  10 min 10 MIN  10 MIN  10 MIN

## 2024-01-31 ENCOUNTER — OFFICE VISIT (OUTPATIENT)
Dept: PHYSICAL THERAPY | Age: 84
End: 2024-01-31
Payer: MEDICARE

## 2024-01-31 DIAGNOSIS — M25.511 CHRONIC PAIN OF BOTH SHOULDERS: ICD-10-CM

## 2024-01-31 DIAGNOSIS — M25.512 CHRONIC PAIN OF BOTH SHOULDERS: ICD-10-CM

## 2024-01-31 DIAGNOSIS — M54.2 CERVICAL PAIN: Primary | ICD-10-CM

## 2024-01-31 DIAGNOSIS — G89.29 CHRONIC PAIN OF BOTH SHOULDERS: ICD-10-CM

## 2024-01-31 PROCEDURE — 97110 THERAPEUTIC EXERCISES: CPT

## 2024-01-31 PROCEDURE — 97140 MANUAL THERAPY 1/> REGIONS: CPT

## 2024-01-31 NOTE — PROGRESS NOTES
"Daily Note     Today's date: 2024  Patient name: Tara Ash  : 1940  MRN: [id number]  Referring provider: Keegan Acosta PT  Dx:   Encounter Diagnosis     ICD-10-CM    1. Cervical pain  M54.2       2. Chronic pain of both shoulders  M25.511     G89.29     M25.512                            Subjective: \" I haven't had a migraine since I saw my doctor last week. \" HA 4/10; denies neck pain but does feel tight.       Objective: See treatment diary below      Assessment: Difficulty keeping UT's relaxed throughout even with just sitting posture. Right sided discomfort with left UT stretching; min stretch reported and denies pinching. Significant hypertonicity palpated throughout bilateral UT/LS and suboccipital regions. Able to tolerate deep pressure with improved C-S AROM and decreased pain post.       Plan: Cont with Plan of care      Precautions: Fall Risk with recent fall history.      Access Code: [email or web address]  URL: [email or web address]  Date: 2024  Prepared by: Keegan Acosta    Exercises  - Correct Seated Posture  - 1 x daily - 7 x weekly - 2 sets - 10 reps  - Seated Gentle Upper Trapezius Stretch  - 1 x daily - 7 x weekly - 1 sets - 5 reps - 10 seconds hold  - Gentle Levator Scapulae Stretch  - 1 x daily - 7 x weekly - 1 sets - 5 reps - 10 seconds hold  - Seated Scapular Retraction  - 1 x daily - 7 x weekly - 2 sets - 10 reps      Manuals 1/4/24 1/8 1/10 1/15 1/17 1/29 1/31      STM to bilateral UT, cervical and thoracic spine paraspinal musculature 10 min seated  Seated     15 min  Seated x 15 min 10 min  10 min  10 min CM  10 mins                   Neuro Re-Ed                          Ther Ex                          pulleys  5 min  5 min 6 min  6 min  6 min  6 min      Corner pec stretch with bilateral upper extremity at 45 degrees  NT NT NT NT NT NT      Scapular squeezes:B: hep 20X 3SEC  3 sec x 20 20X 3SEC  20X 3SEC  20x 3sec  20x 3 sec                    Seated " physio ball lumbar / shoulder flexion:B:  20X 2SEC  2 x 10 30X  30X 2SEC  30X 2SEC  30x  2sec       UT stretch:B: without over pressure hep NT 10 sec x 5 20SEC 5X  20SEC 5X  [id number]  20 sec 5x      LS stretch:B: without over pressure hep NT 10 sec x 5 NT NT NT NT      Seated postural correction slouch over correct hep 20X  2 sec x 20 20x  20X  20X  20x      Cervical retraction with ext       20x  20x      Cervical spine rotation with towel mobilization to each side  NT NT NT NT NT  NT      Cervical retraction   10 x  20X 3SEC  20X 3SEC  20X 3sec   20x 3 sec      Shoulder scaption and flexion:B:  20X  2 x 10 20X  20X 1#  20x 1#  20x   1#      Biceps curls:B:  20X  2 x 10 20X 2#  20X 2#  20x 3#  20x  3#      Tband rows    RTB   20X  RTB x 20 RTB 20X  BTB 20X  RTB  20X   RTB  20x                   Supine shoulder flexion and bench press:B:  20X  NT NT NT NT NT      Scapular punches and triceps extension:B:  NT NT NT NT NT NT      Side lying ER and abduction:B:  NT NT NT NT NT NT                                                                        Ther Activity                                         Gait Training                                       Modalities       1/31      MHP to cervical spine seated 10 min 10 min  10 min 10 MIN  10 MIN  10 MIN  10 min

## 2024-02-06 ENCOUNTER — OFFICE VISIT (OUTPATIENT)
Dept: PHYSICAL THERAPY | Age: 84
End: 2024-02-06
Payer: MEDICARE

## 2024-02-06 DIAGNOSIS — M25.511 CHRONIC PAIN OF BOTH SHOULDERS: Primary | ICD-10-CM

## 2024-02-06 DIAGNOSIS — M54.2 CERVICAL PAIN: ICD-10-CM

## 2024-02-06 DIAGNOSIS — M25.512 CHRONIC PAIN OF BOTH SHOULDERS: Primary | ICD-10-CM

## 2024-02-06 DIAGNOSIS — G89.29 CHRONIC PAIN OF BOTH SHOULDERS: Primary | ICD-10-CM

## 2024-02-06 PROCEDURE — 97110 THERAPEUTIC EXERCISES: CPT

## 2024-02-06 PROCEDURE — 97140 MANUAL THERAPY 1/> REGIONS: CPT

## 2024-02-06 NOTE — PROGRESS NOTES
"Daily Note     Today's date: 2024  Patient name: Tara Ash  : 1940  MRN: [id number]  Referring provider: Keegan Acosta, PT  Dx:   Encounter Diagnosis     ICD-10-CM    1. Chronic pain of both shoulders  M25.511     G89.29     M25.512       2. Cervical pain  M54.2                      Exercises  - Seated Cervical Retraction  - 1 x daily - 7 x weekly - 3 sets - 10 reps  - Seated Levator Scapulae Stretch  - 1 x daily - 7 x weekly - 3 sets - 10 reps  - Seated Upper Trapezius Stretch  - 1 x daily - 7 x weekly - 3 sets - 10 reps  - Seated Cervical Rotation AROM  - 1 x daily - 7 x weekly - 3 sets - 10 reps  - Seated Cervical Retraction and Extension  - 1 x daily - 7 x weekly - 3 sets - 10 reps  - Standing Scapular Retraction  - 1 x daily - 7 x weekly - 3 sets - 10 reps          Subjective: Pt reported \"I am feeling better overall\"       Objective: See treatment diary below      Assessment: Improved tolerance to exercises. Progress as able . Pt provided with additional HEP       Plan: Cont with Plan of care      Precautions: Fall Risk with recent fall history.      Access Code: [email or web address]  URL: [email or web address]  Date: 2024  Prepared by: Keegan Acosta    Exercises  - Correct Seated Posture  - 1 x daily - 7 x weekly - 2 sets - 10 reps  - Seated Gentle Upper Trapezius Stretch  - 1 x daily - 7 x weekly - 1 sets - 5 reps - 10 seconds hold  - Gentle Levator Scapulae Stretch  - 1 x daily - 7 x weekly - 1 sets - 5 reps - 10 seconds hold  - Seated Scapular Retraction  - 1 x daily - 7 x weekly - 2 sets - 10 reps      Manuals 1/4/24 1/8 1/10 1/15 1/17 1/29 1/31 2/6     STM to bilateral UT, cervical and thoracic spine paraspinal musculature 10 min seated  Seated     15 min  Seated x 15 min 10 min  10 min  10 min CM  10 mins 10 min                   Neuro Re-Ed                           Ther Ex             Mustep         10 min     pulleys  5 min  5 min 6 min  6 min  6 min  6 min 6 mi    "   Corner pec stretch with bilateral upper extremity at 45 degrees  NT NT NT NT NT NT NT     Scapular squeezes:B: hep 20X 3SEC  3 sec x 20 20X 3SEC  20X 3SEC  20x 3sec  20x 3 sec  20X 3SEC                   Seated physio ball lumbar / shoulder flexion:B:  20X 2SEC  2 x 10 30X  30X 2SEC  30X 2SEC  30x  2sec  20X 3SEC      UT stretch:B: without over pressure hep NT 10 sec x 5 20SEC 5X  20SEC 5X  [id number]  20 sec 5x 20SEC 5X      LS stretch:B: without over pressure hep NT 10 sec x 5 NT NT NT NT 20SEC 5X      Seated postural correction slouch over correct hep 20X  2 sec x 20 20x  20X  20X  20x 20X      Cervical retraction with ext       20x  20x 20X 2SEC      Cervical spine rotation with towel mobilization to each side  NT NT NT NT NT  NT NT     Cervical retraction   10 x  20X 3SEC  20X 3SEC  20X 3sec   20x 3 sec 20X 3SEC      Shoulder scaption and flexion:B:  20X  2 x 10 20X  20X 1#  20x 1#  20x   1# NT     Biceps curls:B:  20X  2 x 10 20X 2#  20X 2#  20x 3#  20x  3# 20X 3#      Tband rows    RTB   20X  RTB x 20 RTB 20X  BTB 20X  RTB  20X   RTB  20x BTB 20X                   Supine shoulder flexion and bench press:B:  20X  NT NT NT NT NT 20X 3#      Scapular punches and triceps extension:B:  NT NT NT NT NT NT NT     Side lying ER and abduction:B:  NT NT NT NT NT NT NT                                                                       Ther Activity                                         Gait Training                                       Modalities       1/31 2/6      MHP to cervical spine seated 10 min 10 min  10 min 10 MIN  10 MIN  10 MIN  10 min 10 MIN

## 2024-02-13 ENCOUNTER — APPOINTMENT (OUTPATIENT)
Dept: PHYSICAL THERAPY | Age: 84
End: 2024-02-13
Payer: MEDICARE

## 2024-02-16 ENCOUNTER — APPOINTMENT (OUTPATIENT)
Dept: PHYSICAL THERAPY | Age: 84
End: 2024-02-16
Payer: MEDICARE

## 2024-02-28 ENCOUNTER — OFFICE VISIT (OUTPATIENT)
Dept: PHYSICAL THERAPY | Age: 84
End: 2024-02-28
Payer: MEDICARE

## 2024-02-28 DIAGNOSIS — M25.511 CHRONIC PAIN OF BOTH SHOULDERS: Primary | ICD-10-CM

## 2024-02-28 DIAGNOSIS — M25.512 CHRONIC PAIN OF BOTH SHOULDERS: Primary | ICD-10-CM

## 2024-02-28 DIAGNOSIS — G89.29 CHRONIC PAIN OF BOTH SHOULDERS: Primary | ICD-10-CM

## 2024-02-28 DIAGNOSIS — M54.2 CERVICAL PAIN: ICD-10-CM

## 2024-02-28 PROCEDURE — 97110 THERAPEUTIC EXERCISES: CPT

## 2024-02-28 PROCEDURE — 97140 MANUAL THERAPY 1/> REGIONS: CPT

## 2024-02-28 NOTE — PROGRESS NOTES
"Daily Note     Today's date: 2024  Patient name: Tara Ash  : 1940  MRN: [id number]  Referring provider: Keegan Acosta, PT  Dx:   Encounter Diagnosis     ICD-10-CM    1. Chronic pain of both shoulders  M25.511     G89.29     M25.512       2. Cervical pain  M54.2                        Exercises  - Seated Cervical Retraction  - 1 x daily - 7 x weekly - 3 sets - 10 reps  - Seated Levator Scapulae Stretch  - 1 x daily - 7 x weekly - 3 sets - 10 reps  - Seated Upper Trapezius Stretch  - 1 x daily - 7 x weekly - 3 sets - 10 reps  - Seated Cervical Rotation AROM  - 1 x daily - 7 x weekly - 3 sets - 10 reps  - Seated Cervical Retraction and Extension  - 1 x daily - 7 x weekly - 3 sets - 10 reps  - Standing Scapular Retraction  - 1 x daily - 7 x weekly - 3 sets - 10 reps          Subjective: Pt reported \"I am tired and achy. I had a shot on my hip last week when I was in the hospital. I haven't been feeling well for awhile now I have been hospitalized a few times for my right hip and congestive failure. I want to take it easy today\"       Objective: See treatment diary below      Assessment: Fair tolerance to Modified session today.       Plan: Cont with Plan of care      Precautions: Fall Risk with recent fall history.      Access Code: [email or web address]  URL: [email or web address]  Date: 2024  Prepared by: Keegan Acosta    Exercises  - Correct Seated Posture  - 1 x daily - 7 x weekly - 2 sets - 10 reps  - Seated Gentle Upper Trapezius Stretch  - 1 x daily - 7 x weekly - 1 sets - 5 reps - 10 seconds hold  - Gentle Levator Scapulae Stretch  - 1 x daily - 7 x weekly - 1 sets - 5 reps - 10 seconds hold  - Seated Scapular Retraction  - 1 x daily - 7 x weekly - 2 sets - 10 reps      Manuals 1/4/24 1/8 1/10 1/15 1/17 1/29 1/31 2/6 2/28    STM to bilateral UT, cervical and thoracic spine paraspinal musculature    AND PROM OF LEFT SHOULDER  10 min seated  Seated     15 min  Seated x 15 min 10 min  " 10 min  10 min CM  10 mins 10 min  10 min                  Neuro Re-Ed                           Ther Ex             Mustep         10 min NT    pulleys  5 min  5 min 6 min  6 min  6 min  6 min 6 mi  6 MIN     Corner pec stretch with bilateral upper extremity at 45 degrees  NT NT NT NT NT NT NT NT    Scapular squeezes:B: hep 20X 3SEC  3 sec x 20 20X 3SEC  20X 3SEC  20x 3sec  20x 3 sec  20X 3SEC  20X 3SEC                  Seated physio ball lumbar / shoulder flexion:B:  20X 2SEC  2 x 10 30X  30X 2SEC  30X 2SEC  30x  2sec  20X 3SEC  NT    UT stretch:B: without over pressure hep NT 10 sec x 5 20SEC 5X  20SEC 5X  [id number]  20 sec 5x 20SEC 5X  NT    LS stretch:B: without over pressure hep NT 10 sec x 5 NT NT NT NT 20SEC 5X  NT    Seated postural correction slouch over correct hep 20X  2 sec x 20 20x  20X  20X  20x 20X  20X     Cervical retraction with ext       20x  20x 20X 2SEC  NT    Cervical spine rotation with towel mobilization to each side  NT NT NT NT NT  NT NT NT    Cervical retraction   10 x  20X 3SEC  20X 3SEC  20X 3sec   20x 3 sec 20X 3SEC  NT    Shoulder scaption and flexion:B:  20X  2 x 10 20X  20X 1#  20x 1#  20x   1# NT NT    Biceps curls:B:  20X  2 x 10 20X 2#  20X 2#  20x 3#  20x  3# 20X 3#  20X     Tband rows    RTB   20X  RTB x 20 RTB 20X  BTB 20X  RTB  20X   RTB  20x BTB 20X  NT                 Supine shoulder flexion and bench press:B:  20X  NT NT NT NT NT 20X 3#  NT    Scapular punches and triceps extension:B:  NT NT NT NT NT NT NT NT    Side lying ER and abduction:B:  NT NT NT NT NT NT NT NT                 LAQ         20X     Hip flexion          20x     HR/TR         20x                  Ther Activity                                         Gait Training                                       Modalities       1/31 2/6      MHP to cervical spine seated 10 min 10 min  10 min 10 MIN  10 MIN  10 MIN  10 min 10 MIN  10 min

## 2024-03-06 ENCOUNTER — OFFICE VISIT (OUTPATIENT)
Dept: PHYSICAL THERAPY | Age: 84
End: 2024-03-06
Payer: MEDICARE

## 2024-03-06 DIAGNOSIS — G89.29 CHRONIC PAIN OF BOTH SHOULDERS: Primary | ICD-10-CM

## 2024-03-06 DIAGNOSIS — M25.511 CHRONIC PAIN OF BOTH SHOULDERS: Primary | ICD-10-CM

## 2024-03-06 DIAGNOSIS — M25.512 CHRONIC PAIN OF BOTH SHOULDERS: Primary | ICD-10-CM

## 2024-03-06 PROCEDURE — 97140 MANUAL THERAPY 1/> REGIONS: CPT

## 2024-03-06 PROCEDURE — 97110 THERAPEUTIC EXERCISES: CPT

## 2024-03-06 NOTE — PROGRESS NOTES
"Daily Note     Today's date: 3/6/2024  Patient name: Tara Ash  : 1940  MRN: [id number]  Referring provider: Keegan Acosta, PT  Dx:   Encounter Diagnosis     ICD-10-CM    1. Chronic pain of both shoulders  M25.511     G89.29     M25.512                          Exercises  - Seated Cervical Retraction  - 1 x daily - 7 x weekly - 3 sets - 10 reps  - Seated Levator Scapulae Stretch  - 1 x daily - 7 x weekly - 3 sets - 10 reps  - Seated Upper Trapezius Stretch  - 1 x daily - 7 x weekly - 3 sets - 10 reps  - Seated Cervical Rotation AROM  - 1 x daily - 7 x weekly - 3 sets - 10 reps  - Seated Cervical Retraction and Extension  - 1 x daily - 7 x weekly - 3 sets - 10 reps  - Standing Scapular Retraction  - 1 x daily - 7 x weekly - 3 sets - 10 reps          Subjective: Pt reported \"my shoulders and neck still get so sore\"       Objective: See treatment diary below      Assessment: Decreased B UT shoulder and cervical pain with exercises. Progress as able       Plan: Cont with Plan of care      Precautions: Fall Risk with recent fall history.      Access Code: [email or web address]  URL: [email or web address]  Date: 2024  Prepared by: Keegan Acosta    Exercises  - Correct Seated Posture  - 1 x daily - 7 x weekly - 2 sets - 10 reps  - Seated Gentle Upper Trapezius Stretch  - 1 x daily - 7 x weekly - 1 sets - 5 reps - 10 seconds hold  - Gentle Levator Scapulae Stretch  - 1 x daily - 7 x weekly - 1 sets - 5 reps - 10 seconds hold  - Seated Scapular Retraction  - 1 x daily - 7 x weekly - 2 sets - 10 reps      Manuals 1/4/24 1/8 1/10 1/15 1/17 1/29 1/31 2/6 2/28 3/6   STM to bilateral UT, cervical and thoracic spine paraspinal musculature    AND PROM OF LEFT SHOULDER  10 min seated  Seated     15 min  Seated x 15 min 10 min  10 min  10 min CM  10 mins 10 min  10 min  10 min                 Neuro Re-Ed                           Ther Ex             Mustep         10 min NT NT   pulleys  5 min  5 min 6 min  6 " min  6 min  6 min 6 mi  6 MIN  6 MIN    Corner pec stretch with bilateral upper extremity at 45 degrees  NT NT NT NT NT NT NT NT NT   Scapular squeezes:B: hep 20X 3SEC  3 sec x 20 20X 3SEC  20X 3SEC  20x 3sec  20x 3 sec  20X 3SEC  20X 3SEC  20X 3SEC                 Seated physio ball lumbar / shoulder flexion:B:  20X 2SEC  2 x 10 30X  30X 2SEC  30X 2SEC  30x  2sec  20X 3SEC  NT 20X    UT stretch:B: without over pressure hep NT 10 sec x 5 20SEC 5X  20SEC 5X  [id number]  20 sec 5x 20SEC 5X  NT 20SEC 5X    LS stretch:B: without over pressure hep NT 10 sec x 5 NT NT NT NT 20SEC 5X  NT NT   Seated postural correction slouch over correct hep 20X  2 sec x 20 20x  20X  20X  20x 20X  20X  20X    Cervical retraction with ext       20x  20x 20X 2SEC  NT 20X 2SEC    Cervical spine rotation with towel mobilization to each side  NT NT NT NT NT  NT NT NT NT   Cervical retraction   10 x  20X 3SEC  20X 3SEC  20X 3sec   20x 3 sec 20X 3SEC  NT NT   Shoulder scaption and flexion:B:  20X  2 x 10 20X  20X 1#  20x 1#  20x   1# NT NT 20X    Biceps curls:B:  20X  2 x 10 20X 2#  20X 2#  20x 3#  20x  3# 20X 3#  20X  20X 2#    Tband rows    RTB   20X  RTB x 20 RTB 20X  BTB 20X  RTB  20X   RTB  20x BTB 20X  NT 20X GTB                 Supine shoulder flexion and bench press:B:  20X  NT NT NT NT NT 20X 3#  NT NT   Scapular punches and triceps extension:B:  NT NT NT NT NT NT NT NT NT   Side lying ER and abduction:B:  NT NT NT NT NT NT NT NT NT                LAQ         20X  NT   Hip flexion          20x  NT   HR/TR         20x  NT                Ther Activity                                         Gait Training                                       Modalities       1/31 2/6      MHP to cervical spine seated 10 min 10 min  10 min 10 MIN  10 MIN  10 MIN  10 min 10 MIN  10 min  10 MIN

## 2024-03-08 ENCOUNTER — OFFICE VISIT (OUTPATIENT)
Dept: PHYSICAL THERAPY | Age: 84
End: 2024-03-08
Payer: MEDICARE

## 2024-03-08 DIAGNOSIS — M25.511 CHRONIC PAIN OF BOTH SHOULDERS: Primary | ICD-10-CM

## 2024-03-08 DIAGNOSIS — M25.512 CHRONIC PAIN OF BOTH SHOULDERS: Primary | ICD-10-CM

## 2024-03-08 DIAGNOSIS — G89.29 CHRONIC PAIN OF BOTH SHOULDERS: Primary | ICD-10-CM

## 2024-03-08 DIAGNOSIS — M54.2 CERVICAL PAIN: ICD-10-CM

## 2024-03-08 PROCEDURE — 97140 MANUAL THERAPY 1/> REGIONS: CPT

## 2024-03-08 PROCEDURE — 97110 THERAPEUTIC EXERCISES: CPT

## 2024-03-08 NOTE — PROGRESS NOTES
"Daily Note     Today's date: 3/8/2024  Patient name: Tara Ash  : 1940  MRN: [id number]  Referring provider: Keegan Acosta, PT  Dx:   Encounter Diagnosis     ICD-10-CM    1. Chronic pain of both shoulders  M25.511     G89.29     M25.512       2. Cervical pain  M54.2                            Exercises  - Seated Cervical Retraction  - 1 x daily - 7 x weekly - 3 sets - 10 reps  - Seated Levator Scapulae Stretch  - 1 x daily - 7 x weekly - 3 sets - 10 reps  - Seated Upper Trapezius Stretch  - 1 x daily - 7 x weekly - 3 sets - 10 reps  - Seated Cervical Rotation AROM  - 1 x daily - 7 x weekly - 3 sets - 10 reps  - Seated Cervical Retraction and Extension  - 1 x daily - 7 x weekly - 3 sets - 10 reps  - Standing Scapular Retraction  - 1 x daily - 7 x weekly - 3 sets - 10 reps          Subjective: Pt reported \"I am feeling a bit better\"       Objective: See treatment diary below      Assessment: Improved tolerance to exercises.       Plan: Cont with Plan of care      Precautions: Fall Risk with recent fall history.      Access Code: [email or web address]  URL: [email or web address]  Date: 2024  Prepared by: Keegan Acosta    Exercises  - Correct Seated Posture  - 1 x daily - 7 x weekly - 2 sets - 10 reps  - Seated Gentle Upper Trapezius Stretch  - 1 x daily - 7 x weekly - 1 sets - 5 reps - 10 seconds hold  - Gentle Levator Scapulae Stretch  - 1 x daily - 7 x weekly - 1 sets - 5 reps - 10 seconds hold  - Seated Scapular Retraction  - 1 x daily - 7 x weekly - 2 sets - 10 reps      Manuals  3/8   1/15 1/17 1/29 1/31 2/6 2/28 3/6   STM to bilateral UT, cervical and thoracic spine paraspinal musculature    AND PROM OF LEFT SHOULDER  10 min    10 min  10 min  10 min CM  10 mins 10 min  10 min  10 min                 Neuro Re-Ed                           Ther Ex             Mustep  BT       10 min NT NT   pulleys  6 MIN    6 min  6 min  6 min  6 min 6 mi  6 MIN  6 MIN    Corner pec stretch with bilateral " upper extremity at 45 degrees NT   NT NT NT NT NT NT NT   Scapular squeezes:B: 20X 3SEC    20X 3SEC  20X 3SEC  20x 3sec  20x 3 sec  20X 3SEC  20X 3SEC  20X 3SEC                 Seated physio ball lumbar / shoulder flexion:B: 20X 3SEC    30X  30X 2SEC  30X 2SEC  30x  2sec  20X 3SEC  NT 20X    UT stretch:B: without over pressure 20SEC 5X    20SEC 5X  20SEC 5X  [id number]  20 sec 5x 20SEC 5X  NT 20SEC 5X    LS stretch:B: without over pressure 20SEC 5X    NT NT NT NT 20SEC 5X  NT NT   Seated postural correction slouch over correct  20X    20x  20X  20X  20x 20X  20X  20X    Cervical retraction with ext  20X 3SEC      20x  20x 20X 2SEC  NT 20X 2SEC    Cervical spine rotation with towel mobilization to each side NT   NT NT NT  NT NT NT NT   Cervical retraction 10X    20X 3SEC  20X 3SEC  20X 3sec   20x 3 sec 20X 3SEC  NT NT   Shoulder scaption and flexion:B: 20X L 1# 20X     R  2# 20X    20X  20X 1#  20x 1#  20x   1# NT NT 20X    Biceps curls:B: 20X 3#     20X 2#  20X 2#  20x 3#  20x  3# 20X 3#  20X  20X 2#    Tband rows   BTB 20X    RTB 20X  BTB 20X  RTB  20X   RTB  20x BTB 20X  NT 20X GTB                 Supine shoulder flexion and bench press:B: 20X 3#    NT NT NT NT 20X 3#  NT NT   Scapular punches and triceps extension:B: NT   NT NT NT NT NT NT NT   Side lying ER and abduction:B: NT   NT NT NT NT NT NT NT                LAQ NT        20X  NT   Hip flexion  NT        20x  NT   HR/TR NT        20x  NT                Ther Activity                                         Gait Training                                       Modalities        1/31 2/6      MHP to cervical spine seated 10 MIN    10 MIN  10 MIN  10 MIN  10 min 10 MIN  10 min  10 MIN

## 2024-03-11 ENCOUNTER — OFFICE VISIT (OUTPATIENT)
Dept: PHYSICAL THERAPY | Age: 84
End: 2024-03-11
Payer: MEDICARE

## 2024-03-11 DIAGNOSIS — G89.29 CHRONIC PAIN OF BOTH SHOULDERS: Primary | ICD-10-CM

## 2024-03-11 DIAGNOSIS — M25.512 CHRONIC PAIN OF BOTH SHOULDERS: Primary | ICD-10-CM

## 2024-03-11 DIAGNOSIS — M25.511 CHRONIC PAIN OF BOTH SHOULDERS: Primary | ICD-10-CM

## 2024-03-11 PROCEDURE — 97110 THERAPEUTIC EXERCISES: CPT

## 2024-03-11 PROCEDURE — 97140 MANUAL THERAPY 1/> REGIONS: CPT

## 2024-03-11 NOTE — PROGRESS NOTES
"Daily Note     Today's date: 3/11/2024  Patient name: Tara Ash  : 1940  MRN: [id number]  Referring provider: Keegan Acosta, PT  Dx:   Encounter Diagnosis     ICD-10-CM    1. Chronic pain of both shoulders  M25.511     G89.29     M25.512                              Exercises  - Seated Cervical Retraction  - 1 x daily - 7 x weekly - 3 sets - 10 reps  - Seated Levator Scapulae Stretch  - 1 x daily - 7 x weekly - 3 sets - 10 reps  - Seated Upper Trapezius Stretch  - 1 x daily - 7 x weekly - 3 sets - 10 reps  - Seated Cervical Rotation AROM  - 1 x daily - 7 x weekly - 3 sets - 10 reps  - Seated Cervical Retraction and Extension  - 1 x daily - 7 x weekly - 3 sets - 10 reps  - Standing Scapular Retraction  - 1 x daily - 7 x weekly - 3 sets - 10 reps          Subjective: Pt reported \"My Left shoulder is hurting today\"       Objective: See treatment diary below      Assessment: Pain at B UT, L >R shoulder pain. Progress as able       Plan: Cont with Plan of care      Precautions: Fall Risk with recent fall history.      Access Code: [email or web address]  URL: [email or web address]  Date: 2024  Prepared by: Keegan Acosta    Exercises  - Correct Seated Posture  - 1 x daily - 7 x weekly - 2 sets - 10 reps  - Seated Gentle Upper Trapezius Stretch  - 1 x daily - 7 x weekly - 1 sets - 5 reps - 10 seconds hold  - Gentle Levator Scapulae Stretch  - 1 x daily - 7 x weekly - 1 sets - 5 reps - 10 seconds hold  - Seated Scapular Retraction  - 1 x daily - 7 x weekly - 2 sets - 10 reps      Manuals  3/8 3/11  1/15 1/17 1/29 1/31 2/6 2/28 3/6   STM to bilateral UT, cervical and thoracic spine paraspinal musculature    AND PROM OF LEFT SHOULDER  10 min  10 min   10 min  10 min  10 min CM  10 mins 10 min  10 min  10 min                 Neuro Re-Ed                           Ther Ex             Mustep  BT HOLD       10 min NT NT   pulleys  6 MIN  6 MIN   6 min  6 min  6 min  6 min 6 mi  6 MIN  6 MIN    Corner pec " stretch with bilateral upper extremity at 45 degrees NT NT  NT NT NT NT NT NT NT   Scapular squeezes:B: 20X 3SEC  20X 3SEC   20X 3SEC  20X 3SEC  20x 3sec  20x 3 sec  20X 3SEC  20X 3SEC  20X 3SEC                 Seated physio ball lumbar / shoulder flexion:B: 20X 3SEC  20X 3SEC   30X  30X 2SEC  30X 2SEC  30x  2sec  20X 3SEC  NT 20X    UT stretch:B: without over pressure 20SEC 5X  20SEC 5X   20SEC 5X  20SEC 5X  [id number]  20 sec 5x 20SEC 5X  NT 20SEC 5X    LS stretch:B: without over pressure 20SEC 5X  20SEC 5X   NT NT NT NT 20SEC 5X  NT NT   Seated postural correction slouch over correct  20X  20X   20x  20X  20X  20x 20X  20X  20X    Cervical retraction with ext  20X 3SEC  20X 3SEC     20x  20x 20X 2SEC  NT 20X 2SEC    Cervical spine rotation with towel mobilization to each side NT NT  NT NT NT  NT NT NT NT   Cervical retraction 10X  10X   20X 3SEC  20X 3SEC  20X 3sec   20x 3 sec 20X 3SEC  NT NT   Shoulder scaption and flexion:B: 20X L 1# 20X     R  2# 20X  20X L 1#     20X R 2#   20X  20X 1#  20x 1#  20x   1# NT NT 20X    Biceps curls:B: 20X 3#   20X 3#   20X 2#  20X 2#  20x 3#  20x  3# 20X 3#  20X  20X 2#    Tband rows   BTB 20X  BTB 20X   RTB 20X  BTB 20X  RTB  20X   RTB  20x BTB 20X  NT 20X GTB                 Supine shoulder flexion and bench press:B: 20X 3#  20X 3#   NT NT NT NT 20X 3#  NT NT   Scapular punches and triceps extension:B: NT NT  NT NT NT NT NT NT NT   Side lying ER and abduction:B: NT NT  NT NT NT NT NT NT NT                LAQ NT NT       20X  NT   Hip flexion  NT NT       20x  NT   HR/TR NT NT       20x  NT                Ther Activity                                         Gait Training                                       Modalities        1/31 2/6      MHP to cervical spine seated 10 MIN  10 MIN   10 MIN  10 MIN  10 MIN  10 min 10 MIN  10 min  10 MIN

## 2024-03-22 ENCOUNTER — OFFICE VISIT (OUTPATIENT)
Dept: PHYSICAL THERAPY | Age: 84
End: 2024-03-22
Payer: MEDICARE

## 2024-03-22 DIAGNOSIS — M54.2 CERVICAL PAIN: ICD-10-CM

## 2024-03-22 DIAGNOSIS — M25.512 CHRONIC PAIN OF BOTH SHOULDERS: Primary | ICD-10-CM

## 2024-03-22 DIAGNOSIS — M25.511 CHRONIC PAIN OF BOTH SHOULDERS: Primary | ICD-10-CM

## 2024-03-22 DIAGNOSIS — G89.29 CHRONIC PAIN OF BOTH SHOULDERS: Primary | ICD-10-CM

## 2024-03-22 PROCEDURE — 97110 THERAPEUTIC EXERCISES: CPT | Performed by: PHYSICAL THERAPIST

## 2024-03-22 PROCEDURE — 97140 MANUAL THERAPY 1/> REGIONS: CPT | Performed by: PHYSICAL THERAPIST

## 2024-03-22 NOTE — PROGRESS NOTES
PT Evaluation  / PT Discharge    Today's date: 3/22/2024  Patient name: Tara Ash  : 1940  MRN: [id number]  Referring provider: Keegan Acosta, PT  Dx:   Encounter Diagnosis     ICD-10-CM    1. Chronic pain of both shoulders  M25.511     G89.29     M25.512       2. Cervical pain  M54.2                      Assessment  Assessment details: PT Reassessment: 3/22/24.  Patient reported she has had multiple bouts of medical issues that have limited her ability to consistently attend PT.  But, she did exhibit short term pain reduction with combination of PT treatment session that involved manual therapy techniques, cervical spine retraction, bilateral upper extremity mobility, postural correction and modalities.  Thus, patient provided with final hep instruction via verbal and written instructions and she will follow up with PCP in the next several weeks to determine her next steps with regards to cervical and lumbar spine symptom management.  Patient provided with new written hep and d/c to follow up with PCP.     PT IE: 24.  Patient reported she had a cervical spine laminectomy in .  Patient noted she has tightness from the shoulders into the cervical spine.  Patient reported she had open hear surgery, a right RTC repair and left lumpectomy.  Patient noted she had a fell in  which required a left TKA in  and had recently finished home care PT.  Patient noted she did use a spc prior to fall, and now uses RW.  Patient noted prolonged use of rolling walker aggravates bilateral GH joint, scapular and cervical spine tightness and pain.  Patient noted she has intermittent tingling sensation into upper cervical spine and posterior head.  Patient noted looking to each side is limited by tightness, as well as looking up.  Patient noted constant tightness is very uncomfortable.  Patient noted dressing activities, reaching, lifting, grooming activities are limited by bilateral upper extremity  tightness and pain.  Patient noted she has bilateral deltoid muscle soreness.  Patient noted she will have and intermittent left hand trigger finger.  Patient denies unilateral upper extremity paraesthesias.  Patient noted she has bilateral upper extremity weakness and tightness that limits self iadls and functional activities.  Patient noted reaching back to put on a coat is difficult.  Patient noted cervical spine and bilateral shoulder region pain at rest is a 5 of 10 and at worst is at 8 of 10.  Patient noted she uses heat and massage to reduce tightness.  Patient denies sleep deficits.  Patient noted prolonged use of RW aggravates bilateral GH joint, scapular and cervical spine region pain. Patient noted she has crepitus with cervical spine movements, but denies pain with crepitus.   Impairments: abnormal or restricted ROM, abnormal movement, impaired physical strength, lacks appropriate home exercise program and pain with function  Understanding of Dx/Px/POC: excellent   Prognosis: good  Prognosis details: Patient is a 83 y.o. year old female seen for outpatient PT evaluation with pain, mobility and functional deficits due to cervical spine and bilateral shoulder pain. Patient presents with the following progress since onset of PT: decrease in cervical and lumbar spine pain, increase in postural awareness, gait progress and functional progress with self iadls and activities.  Patient presents to PT reassessment with the following problems, concerns, deficits and impairments: cervical spine and bilateral upper extremity pain, decreased cervical spine range of motion, decreased bilateral upper extremity mobility and strength, + TTP, decrease in postural awareness, functional limitations and decreased tolerance to activity.  Patient would benefit from skilled PT services under the following PT treatment plan to address the above noted deficits: therapeutic exercises and activities to facilitate bilateral upper  extremity mobility and strength, postural reeducation and strengthening, modalities, manual therapy techniques, IASTM techniques, Kinesio taping techniques and a hep.  Patient provided with final written hep and agrees with PT POC to d/c to hep.  Thank you for the referral.     Goals  Short Term goals 4 - 6 weeks  1.  Patient will be independent HEP. MET.  2.  Patient will report a 25 - 50% decrease in pain complaints.Partially MET.  3.  Increase strength 1/2 grade.  Partially MET.  4.  Increase ROM 5-10 degrees.  Partially MET.    Long Term goals 8 - 12 weeks  1.  Patient will report elimination of pain complaints.Partially MET.  2.  Patient will return to all recreational activities without restriction.Partially MET.  3.  ROM WFL.Partially MET.  4.  Strength 5/5.Partially MET.  5.  Patient will report ability to dress without cervical spine or bilateral upper extremity symptom aggravation or limitations.Partially MET.  6.  Patient will report ability to self reaching activities without cervical spine or bilateral upper extremity symptom aggravation or limitations.Partially MET.  7.  Patient will report ability to perform self lifting based activities without cervical spine or bilateral upper extremity symptom aggravation or limitations.Partially MET.  8.  Patient will report ability to perform grooming activities without cervical spine or bilateral upper extremity symptom aggravation or limitations.Partially MET.    Plan  Patient would benefit from: PT eval and skilled physical therapy  Planned modality interventions: low level laser therapy, TENS, thermotherapy: hydrocollator packs, ultrasound, unattended electrical stimulation, traction, cryotherapy and electrical stimulation/Russian stimulation  Planned therapy interventions: IASTM, joint mobilization, kinesiology taping, manual therapy, massage, neuromuscular re-education, balance, balance/weight bearing training, body mechanics training, patient education,  postural training, compression, strengthening, self care, stretching, therapeutic activities, therapeutic exercise, therapeutic training, transfer training, flexibility, functional ROM exercises, gait training, graded activity, graded exercise, graded motor, home exercise program and IADL retraining  Frequency: 2x week  Duration in weeks: 12  Treatment plan discussed with: patient      Subjective Evaluation    History of Present Illness  Mechanism of injury:   Past Medical History:  Diagnosis Date  Ambulatory dysfunction 04/16/2017  Anxiety  Arthritis  Asthma  CAD (coronary artery disease)  Cataracts, bilateral  Colon polyps  Corneal dystrophy  Coronary atherosclerosis 08/23/2012  Duodenal ulcer  Esophageal reflux 04/11/2014  Essential hypertension 08/23/2012  Gastroparesis  GERD (gastroesophageal reflux disease)  Heart disease  Heart disease  Hiatal hernia  Hyperlipidemia  Hypertension  IBS (irritable bowel syndrome)  Low back pain  Migraine  PAD (peripheral artery disease) ([facility])  PAF (paroxysmal atrial fibrillation) ([facility]) 01/09/2020  Quadriceps tendon rupture 06/01/2023  Schatzki's ring 2014  Sinusitis  Spinal stenosis of lumbar region 03/26/2012  Subclavian artery stenosis, left ([facility])    Past Surgical History:  Procedure Laterality Date  ANGIOPLASTY Right 09/20/2017  (R) CFA angioplasty only  BACK SURGERY 2013  back fusion  BREAST LUMPECTOMY Left 2004  CARPAL TUNNEL RELEASE Right  CATARACT EXTRACTION Bilateral  CERVICAL LAMINECTOMY 1997  Dr [facility]  COLONOSCOPY N/A 10/25/2018  Performed by Saman Gonzales MD at [facility] OR  CORONARY ARTERY BYPASS GRAFT 2005  EYE SURGERY  FEMORAL ARTERY - POPLITEAL ARTERY BYPASS GRAFT Right 04/11/2017  FEMORAL ENDARTERECTOMY Right 04/2017  HYSTERECTOMY  JOINT REPLACEMENT  KNEE SURGERY Right  NV COLONOSCOPY FLX DX W/COLLJ SPEC WHEN PFRMD N/A 09/14/2015  Procedure: COLONOSCOPY; Surgeon: Maicol Garcia MD; Location: [facility] OR; Service:  Gastroenterology  QUADRICEPS TENDON REPAIR Left 06/06/2023  knee  ROTATOR CUFF REPAIR Right  SINUS SURGERY  SINUS SURGERY  TOTAL ABDOMINAL HYSTERECTOMY W/ BILATERAL SALPINGOOPHORECTOMY  WISDOM TOOTH EXTRACTION    PE:  Gen: Pt sitting upright in chair in NAD  Left Lower Extremity: Incisions well healed. No erythema, drainage or evidence of wound dehiscence. no TTP overlying anterior knee. Neurovasc intact, distal pulses intact, sensory intact, pt able to move all toes ankle and knee.    RLE:  Well healed surgical incision, Mod soft tissue swelling, TTP anterior and medial joint line, crepitus, slightly limited ROM, small effusion, no lig instability    XR:None taken today    Dx:    6 Months S/p Left quadriceps tendon repair, OA    Acute on chronic right knee pain, OA - worsened  CT spine cervical wo contrast  Order: [id number]  Impression    IMPRESSION:    No acute intracranial trauma.    Postoperative changes of the cervical spine without evidence of acute fracture.    Please follow cervical spine trauma protocol          Workstation:[id number]  Narrative    History: Trauma    CT examination of the brain and cervical spine was performed using standard  departmental technique.    There are no prior studies available for comparison.      Findings:    The ventricles, sulci and cisterns are prominent consistent with age-appropriate  volume loss. There is intracranial vascular calcification. There is low  attenuation in the periventricular white matter, nonspecific but most suggestive  of small vessel ischemic disease. There is no evidence of intracranial  hemorrhage, mass, mass-effect or acute focal territorial infarction. There is no  depressed skull fracture.    Imaging of the cervical spine demonstrates straightening and slight reversal the  normal lordotic curve. There is loss disc space height at C4-5 and C5-6 C6-7  less so at C3-4. There is been prior laminectomies and resection spinous process  at C3, C4, C5, C6 and  C7.    At C2-3 there are degenerative change facets.    At C3-4 degenerative changes of the right greater than left facets and right  uncovertebral spurring causing right neural foraminal stenosis.    At C4-5 there is bilateral uncovertebral spurring with a posterior disc  osteophyte complex. There is bilateral neural foraminal stenosis.    At C5-6 is bilateral uncovertebral spurring with right greater than left neural  foraminal stenosis. Is a prominent posterior disc osteophyte complex.    At C6-7 there is right greater than left uncovertebral spurring.          Patient Goals  Patient goals for therapy: decreased pain, increased motion, increased strength, independence with ADLs/IADLs and return to sport/leisure activities    Pain  At best pain rating: 3  At worst pain ratin  Location: cervical spine and bilateral shoulders / scapular region          Objective     Tenderness     Additional Tenderness Details  Patient is     Active Range of Motion   Cervical/Thoracic Spine       Cervical    Flexion: 30 degrees   Extension: 24 degrees      Left lateral flexion: 14 degrees      Right lateral flexion: 16 degrees      Left rotation: 40 degrees with pain  Right rotation: 36 degrees    with pain  Left Shoulder   Flexion: 142 degrees with pain  Abduction: 138 degrees with pain    Right Shoulder   Flexion: 160 degrees   Abduction: 152 degrees with pain    Strength/Myotome Testing     Left Shoulder     Planes of Motion   Flexion: 4-   Abduction: 3+   External rotation at 0°: 4-   Internal rotation at 0°: 4     Right Shoulder     Planes of Motion   Flexion: 4-   Abduction: 3+   External rotation at 0°: 4-   Internal rotation at 0°: 4     Left Elbow   Flexion: 4+  Extension: 4    Right Elbow   Flexion: 4+  Extension: 4      Flowsheet Rows      Flowsheet Row Most Recent Value   PT/OT G-Codes    Current Score 57   Projected Score 59               Precautions: Fall Risk with recent fall history.              Plan: D/C to  hep.     Precautions: Fall Risk with recent fall history.    Access Code: [email or web address]  URL: [email or web address]  Date: 03/22/2024  Prepared by: Keegan Acosta    Exercises  - Seated Flexion Stretch  - 1 x daily - 7 x weekly - 2 sets - 10 reps  - Seated Gentle Upper Trapezius Stretch  - 1 x daily - 7 x weekly - 1 sets - 5 reps - 20 seconds hold  - Gentle Levator Scapulae Stretch  - 1 x daily - 7 x weekly - 1 sets - 5 reps - 20 seconds hold  - Seated Scapular Retraction  - 1 x daily - 7 x weekly - 2 sets - 10 reps - 3 seconds hold  - Correct Seated Posture  - 1 x daily - 7 x weekly - 2 sets - 10 reps  - Seated Cervical Retraction  - 1 x daily - 7 x weekly - 2 sets - 10 reps  - Seated Shoulder Flexion  - 1 x daily - 7 x weekly - 2 sets - 10 reps  - Seated Elbow Flexion and Extension AROM  - 1 x daily - 7 x weekly - 2 sets - 10 reps  - Standing Shoulder Scaption  - 1 x daily - 7 x weekly - 2 sets - 10 reps      Manuals  3/8 3/11 3/22  1/15 1/17 1/29 1/31 2/6 2/28 3/6   STM to bilateral UT, cervical and thoracic spine paraspinal musculature    AND PROM OF LEFT SHOULDER  10 min  10 min  10 min  10 min  10 min  10 min CM  10 mins 10 min  10 min  10 min                  Neuro Re-Ed                             Ther Ex              Mustep  BT HOLD        10 min NT NT   pulleys  6 MIN  6 MIN  6 min   6 min  6 min  6 min  6 min 6 mi  6 MIN  6 MIN    Corner pec stretch with bilateral upper extremity at 45 degrees NT NT NT  NT NT NT NT NT NT NT   Scapular squeezes:B: 20X 3SEC  20X 3SEC  20x 3sec  20X 3SEC  20X 3SEC  20x 3sec  20x 3 sec  20X 3SEC  20X 3SEC  20X 3SEC                  Seated physio ball lumbar / shoulder flexion:B: 20X 3SEC  20X 3SEC  20x 3sec   30X  30X 2SEC  30X 2SEC  30x  2sec  20X 3SEC  NT 20X    UT stretch:B: without over pressure 20SEC 5X  20SEC 5X  20sec 5x   20SEC 5X  20SEC 5X  [id number]  20 sec 5x 20SEC 5X  NT 20SEC 5X    LS stretch:B: without over pressure 20SEC 5X  20SEC 5X  20 sec 5x   NT NT  NT NT 20SEC 5X  NT NT   Seated postural correction slouch over correct  20X  20X  10 x 2  20x  20X  20X  20x 20X  20X  20X    Cervical retraction with ext  20X 3SEC  20X 3SEC  10 x     20x  20x 20X 2SEC  NT 20X 2SEC    Cervical spine rotation with towel mobilization to each side NT NT NT  NT NT NT  NT NT NT NT   Cervical retraction 10X  10X  20x   20X 3SEC  20X 3SEC  20X 3sec   20x 3 sec 20X 3SEC  NT NT   Shoulder scaption and flexion:B: 20X L 1# 20X     R  2# 20X  20X L 1#     20X R 2#  1# B  20X  20X 1#  20x 1#  20x   1# NT NT 20X    Biceps curls:B: 20X 3#   20X 3#  3# x 20 and ER:B:  20X 2#  20X 2#  20x 3#  20x  3# 20X 3#  20X  20X 2#    Tband rows   BTB 20X  BTB 20X  BTB x 20  RTB 20X  BTB 20X  RTB  20X   RTB  20x BTB 20X  NT 20X GTB                  Supine shoulder flexion and bench press:B: 20X 3#  20X 3#  NT  NT NT NT NT 20X 3#  NT NT   Scapular punches and triceps extension:B: NT NT NT  NT NT NT NT NT NT NT   Side lying ER and abduction:B: NT NT NT  NT NT NT NT NT NT NT                 LAQ NT NT NT       20X  NT   Hip flexion  NT NT NT       20x  NT   HR/TR NT NT NT       20x  NT                 Ther Activity                                            Gait Training                                          Modalities         1/31 2/6      MHP to cervical spine seated 10 MIN  10 MIN  10 min  10 MIN  10 MIN  10 MIN  10 min 10 MIN  10 min  10 MIN